# Patient Record
Sex: MALE | Race: WHITE
[De-identification: names, ages, dates, MRNs, and addresses within clinical notes are randomized per-mention and may not be internally consistent; named-entity substitution may affect disease eponyms.]

---

## 2021-03-01 ENCOUNTER — APPOINTMENT (OUTPATIENT)
Dept: NEUROSURGERY | Facility: CLINIC | Age: 51
End: 2021-03-01
Payer: MEDICAID

## 2021-03-01 DIAGNOSIS — D36.10 BENIGN NEOPLASM OF PERIPHERAL NERVES AND AUTONOMIC NERVOUS SYSTEM, UNSPECIFIED: ICD-10-CM

## 2021-03-01 PROBLEM — Z00.00 ENCOUNTER FOR PREVENTIVE HEALTH EXAMINATION: Status: ACTIVE | Noted: 2021-03-01

## 2021-03-01 PROCEDURE — 99204 OFFICE O/P NEW MOD 45 MIN: CPT

## 2021-03-01 PROCEDURE — 99072 ADDL SUPL MATRL&STAF TM PHE: CPT

## 2021-04-06 ENCOUNTER — APPOINTMENT (OUTPATIENT)
Dept: NEUROSURGERY | Facility: HOSPITAL | Age: 51
End: 2021-04-06

## 2021-04-16 PROBLEM — Z78.9 NO PERTINENT PAST MEDICAL HISTORY: Status: RESOLVED | Noted: 2021-04-16 | Resolved: 2021-04-16

## 2021-04-20 ENCOUNTER — APPOINTMENT (OUTPATIENT)
Dept: NEUROSURGERY | Facility: CLINIC | Age: 51
End: 2021-04-20
Payer: MEDICAID

## 2021-04-20 DIAGNOSIS — Z78.9 OTHER SPECIFIED HEALTH STATUS: ICD-10-CM

## 2021-04-20 PROCEDURE — 99024 POSTOP FOLLOW-UP VISIT: CPT

## 2021-04-20 NOTE — PHYSICAL EXAM
[General Appearance - Alert] : alert [General Appearance - Well-Appearing] : healthy appearing [Clean] : clean [Dry] : dry [Oriented To Time, Place, And Person] : oriented to person, place, and time [Motor Strength] : muscle strength was normal in all four extremities [Sclera] : the sclera and conjunctiva were normal [] : no respiratory distress [Apical Impulse] : the apical impulse was normal [Full Pulse] : the pedal pulses are present [Edema] : there was no peripheral edema [Abnormal Walk] : normal gait [Motor Tone] : muscle strength and tone were normal [No Drainage] : without drainage [Able to toe walk] : the patient was able to toe walk [Able to heel walk] : the patient was able to heel walk [Intact] : all motor groups within normal limits of strength and tone bilaterally [Cranial Nerves Optic (II)] : visual acuity intact bilaterally,  pupils equal round and reactive to light [Cranial Nerves Oculomotor (III)] : extraocular motion intact [Cranial Nerves Trigeminal (V)] : facial sensation intact symmetrically [Cranial Nerves Vestibulocochlear (VIII)] : hearing was intact bilaterally [Cranial Nerves Facial (VII)] : face symmetrical [No Muscle Atrophy] : normal bulk in all four extremities [FreeTextEntry7] : decreased sensation along incision site, otherwise intact [FreeTextEntry1] : well healed LLE surgical incision. No erythema, significant edema, drainage.

## 2021-04-20 NOTE — HISTORY OF PRESENT ILLNESS
[FreeTextEntry1] : Mr. 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 Damon and myself. He denies fevers, wound drainage, weakness. He has been able to return to work. His endorses mild burning dysesthesias in the lateral aspect of the left foot when he touches it or crosses his left leg, and shaun-incisional numbness.

## 2021-04-20 NOTE — DATA REVIEWED
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 \par \par INDICATIONS: The patient is a 50ym who presents with an enlarging subcutaneous left lower extremity mass with associated numbness and paroxysmal pain with clinical and imaging findings most consistent with a peripheral nerve sheath tumor. The patient has had one attempt at resection under local anesthesia that was aborted secondary to intraoperative pain. Alternative management strategies discussed. Risks of surgery including but not limited to bleeding, infection, vascular injury, wound healing difficulties, numbness/loss of sensation, weakness, failure to resect, recurrence, need for additional procedures discussed. The patient understands the risks, benefits, alternatives, and wishes for us to proceed. \par  \par PROCEDURE: The patient was taken to the operating room and placed supine on the operating room table. Generalized endotracheal anesthesia was induced uneventfully. The patient received intravenous antibiotics. The patient was then turned prone onto gel rolls. Sequential compression devices were placed on the right lower extremity. All pressure points were examined and noted to be well padded. The patient's left lower extremity was then prepped and draped in a sterile fashion. \par \par A longitudinal incision centered over the palpable mass in the subcutaneous tissue of the posterior, distal left lower extremity was taken through skin and subcutaneous tissue. Blunt and sharp dissection techniques were used to expose the well encapsulated, firm mass lesion which had the gross appearance of a schwannoma. Careful dissection techniques were used to expose the left sural nerve. The saphenous vein was identified and protected. A single fascicle was noted to be entering and then exiting the tumor. This fascicle was dissected free from the surrounding tissue and sharply divided both proximally and distally, allowing the tumor to be passed off the field as a single specimen. The tumor was then sent to pathology for analysis. The entire exposure was irrigated copiously with antibiotic containing solution. Hemostasis was obtained with bipolar electrocautery. The incision was then closed in layers and a sterile dressing was applied. The patient was turned supine, extubated, and transported to the recovery room in stable condition.  \par \par There were no intraoperative complications. The sponge, needle, and instrument counts were correct at the end of the case. \par \par EBL: 0cc\par COMPLICATIONS: None.\par WOUND CLASSIFICATION: Clean.\par SPECIMENS: Subcutaneous mass lesion sent to pathology for permanent section.\par \par Yury Lowery MD\par \par FInal Report Diagnoses\par LEFT LOWER EXTREMITY PERIPHERAL NERVE TUMOR: EXCISION\par -  BENIGN NEURAL TUMOR (2.7 CM), CONSISTENT WITH SCHWANNOMA\par -  IMMUNOHISTOCHEMICAL STAIN RESULTS SUPPORT THE DIAGNOSIS (S-100 POSITIVE; CD34 FOCAL POSITIVITY; SMA AND DESMIN NEGATIVE; KI-67 <1%)  \par

## 2021-04-20 NOTE — ASSESSMENT
[FreeTextEntry1] :  has made an excellent recovery following gross total resection of benign left sural nerve schwannoma. There is no indication for additional imaging or neurosurgical intervention at this time. I have instructed the patient to contact me for any symptomatic development or progression at which time I will see him in expedited follow up.

## 2021-04-20 NOTE — REASON FOR VISIT
[de-identified] : 04/06/21 [de-identified] : Resection of left sural nerve peripheral nerve sheath tumor